# Patient Record
Sex: FEMALE | Race: WHITE | NOT HISPANIC OR LATINO | Employment: OTHER | URBAN - NONMETROPOLITAN AREA
[De-identification: names, ages, dates, MRNs, and addresses within clinical notes are randomized per-mention and may not be internally consistent; named-entity substitution may affect disease eponyms.]

---

## 2023-02-13 ENCOUNTER — TELEPHONE (OUTPATIENT)
Dept: UROLOGY | Facility: CLINIC | Age: 78
End: 2023-02-13
Payer: COMMERCIAL

## 2023-03-01 ENCOUNTER — TELEPHONE (OUTPATIENT)
Dept: UROLOGY | Facility: CLINIC | Age: 78
End: 2023-03-01
Payer: COMMERCIAL

## 2023-03-01 ENCOUNTER — OFFICE VISIT (OUTPATIENT)
Dept: UROLOGY | Facility: CLINIC | Age: 78
End: 2023-03-01
Payer: COMMERCIAL

## 2023-03-01 ENCOUNTER — PATIENT ROUNDING (BHMG ONLY) (OUTPATIENT)
Dept: UROLOGY | Facility: CLINIC | Age: 78
End: 2023-03-01
Payer: COMMERCIAL

## 2023-03-01 VITALS — WEIGHT: 181 LBS | TEMPERATURE: 97.6 F

## 2023-03-01 DIAGNOSIS — N95.2 ATROPHIC VAGINITIS: ICD-10-CM

## 2023-03-01 DIAGNOSIS — N39.0 RECURRENT UTI: Primary | ICD-10-CM

## 2023-03-01 LAB
BILIRUB BLD-MCNC: NEGATIVE MG/DL
CLARITY, POC: CLEAR
COLOR UR: YELLOW
GLUCOSE UR STRIP-MCNC: NEGATIVE MG/DL
KETONES UR QL: NEGATIVE
LEUKOCYTE EST, POC: ABNORMAL
NITRITE UR-MCNC: NEGATIVE MG/ML
PH UR: 5 [PH] (ref 5–8)
PROT UR STRIP-MCNC: NEGATIVE MG/DL
RBC # UR STRIP: ABNORMAL /UL
SP GR UR: 1.02 (ref 1–1.03)
UROBILINOGEN UR QL: NORMAL

## 2023-03-01 PROCEDURE — 99204 OFFICE O/P NEW MOD 45 MIN: CPT

## 2023-03-01 PROCEDURE — 81001 URINALYSIS AUTO W/SCOPE: CPT

## 2023-03-01 RX ORDER — ATORVASTATIN CALCIUM 40 MG/1
1 TABLET, FILM COATED ORAL DAILY
COMMUNITY
Start: 2023-01-09

## 2023-03-01 RX ORDER — POTASSIUM CHLORIDE 750 MG/1
1 TABLET, FILM COATED, EXTENDED RELEASE ORAL DAILY
COMMUNITY
Start: 2022-11-21

## 2023-03-01 RX ORDER — AMLODIPINE BESYLATE 10 MG/1
1 TABLET ORAL DAILY
COMMUNITY
Start: 2022-12-27

## 2023-03-01 RX ORDER — SPIRONOLACTONE 25 MG/1
1 TABLET ORAL DAILY
COMMUNITY
Start: 2023-02-06

## 2023-03-01 RX ORDER — OMEPRAZOLE 40 MG/1
CAPSULE, DELAYED RELEASE ORAL
COMMUNITY
Start: 2023-01-13

## 2023-03-01 RX ORDER — ATENOLOL 50 MG/1
2 TABLET ORAL DAILY
COMMUNITY
Start: 2023-02-13

## 2023-03-01 RX ORDER — PHENAZOPYRIDINE HYDROCHLORIDE 200 MG/1
TABLET, FILM COATED ORAL
COMMUNITY
Start: 2023-01-13

## 2023-03-01 RX ORDER — AMOXICILLIN AND CLAVULANATE POTASSIUM 875; 125 MG/1; MG/1
1 TABLET, FILM COATED ORAL EVERY 12 HOURS SCHEDULED
COMMUNITY
Start: 2023-02-07

## 2023-03-01 RX ORDER — CONJUGATED ESTROGENS 0.62 MG/G
CREAM VAGINAL 3 TIMES WEEKLY
Qty: 30 G | Refills: 5 | Status: SHIPPED | OUTPATIENT
Start: 2023-03-01 | End: 2023-03-06

## 2023-03-01 NOTE — PROGRESS NOTES
March 1, 2023    Hello, may I speak with Yuliana Dorado?    My name is Yana    I am  with [facility] [address], [address]  [facility] [address] [phone].    Before we get started may I verify your date of birth? 1945    I am calling to officially welcome you to our practice and ask about your recent visit. Is this a good time to talk? Yes    Tell me about your visit with us. What things went well?  Everything went well. I felt welcome and happy to be here.       We're always looking for ways to make our patients' experiences even better. Do you have recommendations on ways we may improve?  No it was great.    Overall were you satisfied with your first visit to our practice? Yes       I appreciate you taking the time to speak with me today. Is there anything else I can do for you? No      Thank you, and have a great day.

## 2023-03-06 ENCOUNTER — TELEPHONE (OUTPATIENT)
Dept: UROLOGY | Facility: CLINIC | Age: 78
End: 2023-03-06
Payer: COMMERCIAL

## 2023-03-06 DIAGNOSIS — N95.2 ATROPHIC VAGINITIS: Primary | ICD-10-CM

## 2023-03-06 RX ORDER — ESTRADIOL 0.1 MG/G
2 CREAM VAGINAL 2 TIMES WEEKLY
Qty: 42.5 G | Refills: 12 | Status: SHIPPED | OUTPATIENT
Start: 2023-03-06

## 2023-03-06 NOTE — TELEPHONE ENCOUNTER
Patient called stating that the premarin cream would be $400 a month- requesting something cheaper. I informed her I would talk to Blanquita and get back with her.

## 2023-05-31 NOTE — PROGRESS NOTES
"   Physical Therapy Daily Progress Note        Patient: Osiel Camargo   : 1994  Diagnosis/ICD-10 Code:  Acute strain of neck muscle, initial encounter [S16.1XXA]  Referring practitioner: Adarsh Turner MD  Date of Initial Visit: Type: THERAPY  Noted: 2021  Today's Date: 2021  Patient seen for 2 sessions    Recertification: 2021   Next MD appt: 2021.           Subjective Evaluation    History of Present Illness    Subjective comment: Pt c/o 4/10 pain in her LB today.       Objective   See Exercise, Manual, and Modality Logs for complete treatment.       Assessment & Plan     Assessment  Assessment details: Educated pt on proper footwear due to her wearing sandals upon arrival.  Pt able to demonstrate proper log rolling technique after being given instruction.  Pt reported elephant S has been causing her increase pain.  Instructed her to complete stretch without cervical flexion and she was able to perform with no further issues.  Gave pt written HEP of new ex's.    Goals  Plan Goals: Plan Goals: Short Term Goals:  1) I with HEP and have additions/changes by next recertification (ongoing)    2) Patient able to show proper log roll technique. (ongoing)    3) Patient to be more aware of posture and posture correction techniques    4) AROM B Lumbar SB/ROT WNL    5) AROM cervical flexion >=50°    6) AROM B cervical SB >= 35°.    7) AROM B cervical ROT >=75°.      Long Term Goals:  1) AROM for the cervical spine all WNL, no increase in pain.    2) B UE 5/5, no increase in pain.    3) C-spine 5/5, no increase in pain.    4) Patient able to perform 10 minutes of scapular stabilization exercises with good posture and no cueing to correct.    5) Patient to have AROM for the lumbar spine all WNL, no increase in pain.    6) Patient able to perform 20 Bridges with UE \"X\" over [place] with no increase in pain.    7) Patient able to show proper lifting technique floor to waist with no increase in " Subjective    Ms. Dorado is 77 y.o. female    Chief Complaint: recurrent uti & atrophic vaginitis      History of Present Illness    77-year-old female established pt in for history of recurring urinary tract infections and atrophic vaginitis.  Patient reports 1 infection since last visit that actually occurred the very next day after last appointment where patient noted increased urine frequency urgency and slight burning with urination.  Patient states that she took approximately 3 days worth of Augmentin that she had already at the house for use on an as-needed basis which improved her symptoms.  Patient reports was recently on an antibiotic approximately 3 weeks ago for a different illness and since has been battling thrush and vaginal yeast infection.  Was prescribed 1 Diflucan in addition to nystatin powder with no improvement in symptoms.  Patient has remained on vaginal estrogen cream estradiol 2 times weekly.  Reports she has noted slight improvement in vaginal irritation and has not had any urinary tract infections since starting the cream.      Patient is currently completely asymptomatic.  Urinalysis is clear.    The following portions of the patient's history were reviewed and updated as appropriate: allergies, current medications, past family history, past medical history, past social history, past surgical history and problem list.    Review of Systems   Constitutional:  Negative for chills and fever.   Gastrointestinal:  Negative for abdominal pain, anal bleeding and blood in stool.   Genitourinary:  Negative for dysuria and hematuria.       Current Outpatient Medications:     amLODIPine (NORVASC) 10 MG tablet, Take 1 tablet by mouth Daily., Disp: , Rfl:     amoxicillin-clavulanate (AUGMENTIN) 875-125 MG per tablet, Take 1 tablet by mouth Every 12 (Twelve) Hours., Disp: , Rfl:     atenolol (TENORMIN) 50 MG tablet, Take 2 tablets by mouth Daily., Disp: , Rfl:     atorvastatin (LIPITOR) 40 MG tablet,  "Take 1 tablet by mouth Daily., Disp: , Rfl:     empagliflozin (JARDIANCE) 10 MG tablet tablet, Take 1 tablet by mouth Daily., Disp: , Rfl:     estradiol (ESTRACE) 0.1 MG/GM vaginal cream, Insert 2 g into the vagina 2 (Two) Times a Week., Disp: 42.5 g, Rfl: 12    nystatin (MYCOSTATIN) 100,000 unit/mL suspension, Take 5 mL by mouth., Disp: , Rfl:     omeprazole (priLOSEC) 40 MG capsule, TAKE 1 CAPSULE BY MOUTH TWICE DAILY BEFORE MEAL(S), Disp: , Rfl:     phenazopyridine (PYRIDIUM) 200 MG tablet, TAKE 1 TABLET BY MOUTH THREE TIMES DAILY FOR UP TO 2 DAYS, Disp: , Rfl:     potassium chloride 10 MEQ CR tablet, Take 1 tablet by mouth Daily., Disp: , Rfl:     spironolactone (ALDACTONE) 25 MG tablet, Take 1 tablet by mouth Daily., Disp: , Rfl:     fluconazole (DIFLUCAN) 150 MG tablet, Take 1 tablet by mouth Every 3 (Three) Days., Disp: 3 tablet, Rfl: 0    metFORMIN (GLUCOPHAGE) 500 MG tablet, , Disp: , Rfl:     History reviewed. No pertinent past medical history.    History reviewed. No pertinent surgical history.    Social History     Socioeconomic History    Marital status:        History reviewed. No pertinent family history.    Objective    Temp 97.8 °F (36.6 °C)   Ht 162.6 cm (64\")   Wt 75.8 kg (167 lb 3.2 oz)   BMI 28.70 kg/m²     Physical Exam        Results for orders placed or performed in visit on 06/01/23   POC Urinalysis Dipstick, Multipro    Specimen: Urine   Result Value Ref Range    Color Yellow Yellow, Straw, Dark Yellow, Jimena    Clarity, UA Clear Clear    Glucose, UA >=1000 mg/dL (3+) (A) Negative mg/dL    Bilirubin Negative Negative    Ketones, UA Trace (A) Negative    Specific Gravity  1.015 1.005 - 1.030    Blood, UA Trace (A) Negative    pH, Urine 5.5 5.0 - 8.0    Protein, POC Negative Negative mg/dL    Urobilinogen, UA 0.2 E.U./dL Normal, 0.2 E.U./dL    Nitrite, UA Negative Negative    Leukocytes Negative Negative     Assessment and Plan    Diagnoses and all orders for this visit:    1. " pain.    8) Patient able to show proper ergonomics for mopping/sweeping/vacumming.    9) I with final HEP.    Plan  Plan details: Try PPT and/or bridges next visit.               Manual Therapy:         mins  88284;  Therapeutic Exercise:    42     mins  48454;     Neuromuscular Sonya:        mins  43207;    Therapeutic Activity:          mins  10610;     Gait Training:           mins  32091;     Ultrasound:          mins  89650;    Electrical Stimulation:         mins  33207 ( );  Dry Needling          mins self-pay    Timed Treatment:   42   mins   Total Treatment:     42   mins    Mounika Bishop PTA  Physical Therapist Assistant                     Vaginal yeast infection (Primary)  -     fluconazole (DIFLUCAN) 150 MG tablet; Take 1 tablet by mouth Every 3 (Three) Days.  Dispense: 3 tablet; Refill: 0    2. Atrophic vaginitis  -     POC Urinalysis Dipstick, Multipro    3. Recurrent UTI  -     POC Urinalysis Dipstick, Multipro      77-year-old female established pt in for history of recurring urinary tract infections and atrophic vaginitis.    1 UTI since last visit self treated with 3-day course of Augmentin    Currently completely asymptomatic urinalysis is clear    Is now battling oral and vaginal yeast infection due to recent antibiotic usage within the last 3 weeks has had 1 dose of Diflucan and powder nystatin without improvement in symptoms    Will send in Diflucan 150 mg for patient to take every 3 days x 3 doses    We will continue vaginal estrogen cream however recommend increasing the frequency to 3 times weekly    We will follow-up in 6 months for reevaluation

## 2023-06-01 ENCOUNTER — OFFICE VISIT (OUTPATIENT)
Dept: UROLOGY | Facility: CLINIC | Age: 78
End: 2023-06-01

## 2023-06-01 VITALS — HEIGHT: 64 IN | WEIGHT: 167.2 LBS | BODY MASS INDEX: 28.54 KG/M2 | TEMPERATURE: 97.8 F

## 2023-06-01 DIAGNOSIS — N95.2 ATROPHIC VAGINITIS: ICD-10-CM

## 2023-06-01 DIAGNOSIS — B37.31 VAGINAL YEAST INFECTION: Primary | ICD-10-CM

## 2023-06-01 DIAGNOSIS — N39.0 RECURRENT UTI: ICD-10-CM

## 2023-06-01 LAB
BILIRUB BLD-MCNC: NEGATIVE MG/DL
CLARITY, POC: CLEAR
COLOR UR: YELLOW
GLUCOSE UR STRIP-MCNC: ABNORMAL MG/DL
KETONES UR QL: ABNORMAL
LEUKOCYTE EST, POC: NEGATIVE
NITRITE UR-MCNC: NEGATIVE MG/ML
PH UR: 5.5 [PH] (ref 5–8)
PROT UR STRIP-MCNC: NEGATIVE MG/DL
RBC # UR STRIP: ABNORMAL /UL
SP GR UR: 1.01 (ref 1–1.03)
UROBILINOGEN UR QL: ABNORMAL

## 2023-06-01 RX ORDER — FLUCONAZOLE 150 MG/1
150 TABLET ORAL
Qty: 3 TABLET | Refills: 0 | Status: SHIPPED | OUTPATIENT
Start: 2023-06-01

## 2023-11-17 NOTE — PROGRESS NOTES
Subjective    Ms. Dorado is 78 y.o. female    Chief Complaint: Recurrent UTI    History of Present Illness    78-year-old female established patient in for recurrent UTIs and atrophic vaginitis.  Patient reports 0 infections since last visit.  Has remained on 3 nights weekly vaginal estrogen cream.  Continues to report very mild vaginal irritation more around the labia region.  Urinalysis is clear no signs of infection.  Reports previous reported thrush and vaginal yeast infection resolved.    The following portions of the patient's history were reviewed and updated as appropriate: allergies, current medications, past family history, past medical history, past social history, past surgical history and problem list.    Review of Systems   Constitutional:  Negative for chills and fever.   Gastrointestinal:  Negative for abdominal pain, anal bleeding and blood in stool.   Genitourinary:  Positive for vaginal pain. Negative for dysuria and hematuria.         Current Outpatient Medications:     amLODIPine (NORVASC) 10 MG tablet, Take 1 tablet by mouth Daily., Disp: , Rfl:     amoxicillin-clavulanate (AUGMENTIN) 875-125 MG per tablet, Take 1 tablet by mouth Every 12 (Twelve) Hours., Disp: , Rfl:     atenolol (TENORMIN) 50 MG tablet, Take 2 tablets by mouth Daily., Disp: , Rfl:     atorvastatin (LIPITOR) 40 MG tablet, Take 1 tablet by mouth Daily., Disp: , Rfl:     empagliflozin (JARDIANCE) 10 MG tablet tablet, Take 1 tablet by mouth Daily., Disp: , Rfl:     estradiol (ESTRACE) 0.1 MG/GM vaginal cream, Insert 2 g into the vagina 2 (Two) Times a Week., Disp: 42.5 g, Rfl: 12    fluconazole (DIFLUCAN) 150 MG tablet, Take 1 tablet by mouth Every 3 (Three) Days., Disp: 3 tablet, Rfl: 0    metFORMIN (GLUCOPHAGE) 500 MG tablet, , Disp: , Rfl:     omeprazole (priLOSEC) 40 MG capsule, TAKE 1 CAPSULE BY MOUTH TWICE DAILY BEFORE MEAL(S), Disp: , Rfl:     phenazopyridine (PYRIDIUM) 200 MG tablet, TAKE 1 TABLET BY MOUTH THREE TIMES  "DAILY FOR UP TO 2 DAYS, Disp: , Rfl:     potassium chloride 10 MEQ CR tablet, Take 1 tablet by mouth Daily., Disp: , Rfl:     spironolactone (ALDACTONE) 25 MG tablet, Take 1 tablet by mouth Daily., Disp: , Rfl:     History reviewed. No pertinent past medical history.    History reviewed. No pertinent surgical history.    Social History     Socioeconomic History    Marital status:        History reviewed. No pertinent family history.    Objective    Temp 97.4 °F (36.3 °C)   Ht 162.6 cm (64.02\")   Wt 74.1 kg (163 lb 6.4 oz)   BMI 28.03 kg/m²     Physical Exam  Nursing note reviewed.   Constitutional:       General: She is not in acute distress.     Appearance: Normal appearance. She is not ill-appearing.   HENT:      Nose: No congestion.   Abdominal:      Tenderness: There is no right CVA tenderness or left CVA tenderness.      Hernia: No hernia is present.   Skin:     General: Skin is warm and dry.   Neurological:      Mental Status: She is alert and oriented to person, place, and time.   Psychiatric:         Mood and Affect: Mood normal.         Behavior: Behavior normal.             Results for orders placed or performed in visit on 12/01/23   POC Urinalysis Dipstick, Multipro    Specimen: Urine   Result Value Ref Range    Color Yellow Yellow, Straw, Dark Yellow, Jimena    Clarity, UA Clear Clear    Glucose, UA >=1000 mg/dL (3+) (A) Negative mg/dL    Bilirubin Negative Negative    Ketones, UA Negative Negative    Specific Gravity  1.015 1.005 - 1.030    Blood, UA Trace (A) Negative    pH, Urine 5.5 5.0 - 8.0    Protein, POC Negative Negative mg/dL    Urobilinogen, UA 0.2 E.U./dL Normal, 0.2 E.U./dL    Nitrite, UA Negative Negative    Leukocytes Negative Negative     Assessment and Plan    Diagnoses and all orders for this visit:    1. Vaginal yeast infection (Primary)    2. Recurrent UTI  -     POC Urinalysis Dipstick, Multipro    3. Atrophic vaginitis  -     POC Urinalysis Dipstick, " Multipro      Urinalysis is clear no signs of infection.    Reports 0 infections since last visit    Is very satisfied with the results seen with starting the vaginal estrogen cream and is now using 3 nights weekly.  Reports mild labial irritation.  With a resolution of previous thrush and vaginal yeast.    Have instructed patient to try to increase the cream to daily for a couple weeks to see if the labial irritation will improve    Patient does not need refills currently of the cream at this time-will call when in need.    Follow-up in 1 year

## 2023-12-01 ENCOUNTER — OFFICE VISIT (OUTPATIENT)
Dept: UROLOGY | Facility: CLINIC | Age: 78
End: 2023-12-01
Payer: COMMERCIAL

## 2023-12-01 VITALS — WEIGHT: 163.4 LBS | BODY MASS INDEX: 27.9 KG/M2 | HEIGHT: 64 IN | TEMPERATURE: 97.4 F

## 2023-12-01 DIAGNOSIS — N39.0 RECURRENT UTI: ICD-10-CM

## 2023-12-01 DIAGNOSIS — B37.31 VAGINAL YEAST INFECTION: Primary | ICD-10-CM

## 2023-12-01 DIAGNOSIS — N95.2 ATROPHIC VAGINITIS: ICD-10-CM

## 2023-12-01 LAB
BILIRUB BLD-MCNC: NEGATIVE MG/DL
CLARITY, POC: CLEAR
COLOR UR: YELLOW
GLUCOSE UR STRIP-MCNC: ABNORMAL MG/DL
KETONES UR QL: NEGATIVE
LEUKOCYTE EST, POC: NEGATIVE
NITRITE UR-MCNC: NEGATIVE MG/ML
PH UR: 5.5 [PH] (ref 5–8)
PROT UR STRIP-MCNC: NEGATIVE MG/DL
RBC # UR STRIP: ABNORMAL /UL
SP GR UR: 1.01 (ref 1–1.03)
UROBILINOGEN UR QL: ABNORMAL

## 2024-07-08 DIAGNOSIS — N95.2 ATROPHIC VAGINITIS: ICD-10-CM

## 2024-07-08 RX ORDER — ESTRADIOL 0.1 MG/G
CREAM VAGINAL
Qty: 43 G | Refills: 0 | Status: SHIPPED | OUTPATIENT
Start: 2024-07-08

## 2024-08-30 DIAGNOSIS — N95.2 ATROPHIC VAGINITIS: ICD-10-CM

## 2024-09-03 DIAGNOSIS — N95.2 ATROPHIC VAGINITIS: ICD-10-CM

## 2024-09-03 RX ORDER — ESTRADIOL 0.1 MG/G
2 CREAM VAGINAL 3 TIMES WEEKLY
Qty: 43 G | Refills: 0 | Status: SHIPPED | OUTPATIENT
Start: 2024-09-04

## 2024-09-03 RX ORDER — ESTRADIOL 0.1 MG/G
CREAM VAGINAL
Qty: 43 G | Refills: 0 | Status: SHIPPED | OUTPATIENT
Start: 2024-09-03 | End: 2024-09-03 | Stop reason: SDUPTHER

## 2024-10-20 DIAGNOSIS — N95.2 ATROPHIC VAGINITIS: ICD-10-CM

## 2024-10-21 RX ORDER — ESTRADIOL 0.1 MG/G
CREAM VAGINAL
Qty: 43 G | Refills: 0 | Status: SHIPPED | OUTPATIENT
Start: 2024-10-21

## 2024-11-15 DIAGNOSIS — N95.2 ATROPHIC VAGINITIS: ICD-10-CM

## 2024-11-15 RX ORDER — ESTRADIOL 0.1 MG/G
CREAM VAGINAL
Qty: 43 G | Refills: 0 | Status: SHIPPED | OUTPATIENT
Start: 2024-11-15

## 2024-11-23 DIAGNOSIS — N95.2 ATROPHIC VAGINITIS: ICD-10-CM

## 2024-11-25 RX ORDER — ESTRADIOL 0.1 MG/G
CREAM VAGINAL
Qty: 43 G | Refills: 0 | Status: SHIPPED | OUTPATIENT
Start: 2024-11-25

## 2024-12-04 NOTE — PROGRESS NOTES
Subjective    Ms. Dorado is 79 y.o. female    Chief Complaint: recurrent uti, atrophic vaginitis    History of Present Illness    79-year-old female established patient in for annual follow-up recurrent UTIs and atrophic vaginitis. Patient reports 0 infections. Has remained on 3 nights weekly vaginal estrogen cream.  Patient is elated with the results as all vaginal irritation has resolved.urinalysis is clear no signs of infection.  Denies freedman with urinary incontinence.    The following portions of the patient's history were reviewed and updated as appropriate: allergies, current medications, past family history, past medical history, past social history, past surgical history and problem list.    Review of Systems   Constitutional:  Negative for chills and fever.   Gastrointestinal:  Negative for abdominal pain, anal bleeding and blood in stool.   Genitourinary:  Negative for dysuria and hematuria.         Current Outpatient Medications:     amLODIPine (NORVASC) 10 MG tablet, Take 1 tablet by mouth Daily., Disp: , Rfl:     atenolol (TENORMIN) 50 MG tablet, Take 2 tablets by mouth Daily., Disp: , Rfl:     atorvastatin (LIPITOR) 40 MG tablet, Take 1 tablet by mouth Daily., Disp: , Rfl:     empagliflozin (JARDIANCE) 10 MG tablet tablet, Take 1 tablet by mouth Daily., Disp: , Rfl:     potassium chloride 10 MEQ CR tablet, Take 1 tablet by mouth Daily., Disp: , Rfl:     spironolactone (ALDACTONE) 25 MG tablet, Take 1 tablet by mouth Daily., Disp: , Rfl:     amoxicillin-clavulanate (AUGMENTIN) 875-125 MG per tablet, Take 1 tablet by mouth Every 12 (Twelve) Hours. (Patient not taking: Reported on 12/6/2024), Disp: , Rfl:     estradiol (ESTRACE) 0.1 MG/GM vaginal cream, Insert 2 g into the vagina 3 (Three) Times a Week., Disp: 42.5 g, Rfl: 5    fluconazole (DIFLUCAN) 150 MG tablet, Take 1 tablet by mouth Every 3 (Three) Days. (Patient not taking: Reported on 12/6/2024), Disp: 3 tablet, Rfl: 0    metFORMIN (GLUCOPHAGE) 500  "MG tablet, , Disp: , Rfl:     omeprazole (priLOSEC) 40 MG capsule, TAKE 1 CAPSULE BY MOUTH TWICE DAILY BEFORE MEAL(S) (Patient not taking: Reported on 12/6/2024), Disp: , Rfl:     phenazopyridine (PYRIDIUM) 200 MG tablet, TAKE 1 TABLET BY MOUTH THREE TIMES DAILY FOR UP TO 2 DAYS (Patient not taking: Reported on 12/6/2024), Disp: , Rfl:     History reviewed. No pertinent past medical history.    History reviewed. No pertinent surgical history.    Social History     Socioeconomic History    Marital status:    Tobacco Use    Smoking status: Never     Passive exposure: Never    Smokeless tobacco: Never   Vaping Use    Vaping status: Never Used   Substance and Sexual Activity    Alcohol use: Defer    Drug use: Defer    Sexual activity: Defer       History reviewed. No pertinent family history.    Objective    Temp 97.5 °F (36.4 °C)   Ht 162.6 cm (64.02\")   Wt 79.4 kg (175 lb)   BMI 30.02 kg/m²     Physical Exam        Results for orders placed or performed in visit on 12/06/24   POC Urinalysis Dipstick, Multipro    Collection Time: 12/06/24 10:54 AM    Specimen: Urine   Result Value Ref Range    Color Yellow Yellow, Straw, Dark Yellow, Jimena    Clarity, UA Clear Clear    Glucose, UA >=1000 mg/dL (3+) (A) Negative mg/dL    Bilirubin Negative Negative    Ketones, UA Negative Negative    Specific Gravity  1.010 1.005 - 1.030    Blood, UA Trace (A) Negative    pH, Urine 5.5 5.0 - 8.0    Protein, POC Negative Negative mg/dL    Urobilinogen, UA 0.2 E.U./dL Normal, 0.2 E.U./dL    Nitrite, UA Negative Negative    Leukocytes Negative Negative     Assessment and Plan    Diagnoses and all orders for this visit:    1. Atrophic vaginitis (Primary)  -     POC Urinalysis Dipstick, Multipro  -     estradiol (ESTRACE) 0.1 MG/GM vaginal cream; Insert 2 g into the vagina 3 (Three) Times a Week.  Dispense: 42.5 g; Refill: 5    2. Recurrent UTI  -     POC Urinalysis Dipstick, Multipro        Urinalysis is clear    Patient " asymptomatic    0 reported infections over the last year    Will send in refills of estradiol cream to be used 2-3 nights weekly     Follow-up in 1 year

## 2024-12-06 ENCOUNTER — OFFICE VISIT (OUTPATIENT)
Dept: UROLOGY | Facility: CLINIC | Age: 79
End: 2024-12-06
Payer: COMMERCIAL

## 2024-12-06 VITALS — HEIGHT: 64 IN | TEMPERATURE: 97.5 F | BODY MASS INDEX: 29.88 KG/M2 | WEIGHT: 175 LBS

## 2024-12-06 DIAGNOSIS — N39.0 RECURRENT UTI: ICD-10-CM

## 2024-12-06 DIAGNOSIS — N95.2 ATROPHIC VAGINITIS: Primary | ICD-10-CM

## 2024-12-06 RX ORDER — ESTRADIOL 0.1 MG/G
2 CREAM VAGINAL 3 TIMES WEEKLY
Qty: 42.5 G | Refills: 5 | Status: SHIPPED | OUTPATIENT
Start: 2024-12-06

## 2025-01-03 DIAGNOSIS — N95.2 ATROPHIC VAGINITIS: ICD-10-CM

## 2025-01-06 RX ORDER — ESTRADIOL 0.1 MG/G
CREAM VAGINAL
Qty: 43 G | Refills: 0 | Status: SHIPPED | OUTPATIENT
Start: 2025-01-06

## 2025-01-31 DIAGNOSIS — N95.2 ATROPHIC VAGINITIS: ICD-10-CM

## 2025-01-31 RX ORDER — ESTRADIOL 0.1 MG/G
CREAM VAGINAL
Qty: 43 G | Refills: 0 | Status: SHIPPED | OUTPATIENT
Start: 2025-01-31

## 2025-03-25 DIAGNOSIS — N95.2 ATROPHIC VAGINITIS: ICD-10-CM

## 2025-03-25 RX ORDER — ESTRADIOL 0.1 MG/G
CREAM VAGINAL
Qty: 43 G | Refills: 0 | Status: SHIPPED | OUTPATIENT
Start: 2025-03-25

## 2025-05-12 DIAGNOSIS — N95.2 ATROPHIC VAGINITIS: ICD-10-CM

## 2025-05-12 RX ORDER — ESTRADIOL 0.1 MG/G
CREAM VAGINAL
Qty: 43 G | Refills: 0 | Status: SHIPPED | OUTPATIENT
Start: 2025-05-12

## 2025-05-30 DIAGNOSIS — N95.2 ATROPHIC VAGINITIS: ICD-10-CM

## 2025-05-30 RX ORDER — ESTRADIOL 0.1 MG/G
CREAM VAGINAL
Qty: 43 G | Refills: 1 | Status: SHIPPED | OUTPATIENT
Start: 2025-05-30

## 2025-07-23 DIAGNOSIS — N95.2 ATROPHIC VAGINITIS: ICD-10-CM

## 2025-07-23 RX ORDER — ESTRADIOL 0.1 MG/G
CREAM VAGINAL
Qty: 43 G | Refills: 2 | Status: SHIPPED | OUTPATIENT
Start: 2025-07-23